# Patient Record
Sex: FEMALE | Race: WHITE | Employment: FULL TIME | URBAN - METROPOLITAN AREA
[De-identification: names, ages, dates, MRNs, and addresses within clinical notes are randomized per-mention and may not be internally consistent; named-entity substitution may affect disease eponyms.]

---

## 2019-12-16 ENCOUNTER — APPOINTMENT (OUTPATIENT)
Dept: CT IMAGING | Age: 34
End: 2019-12-16
Payer: COMMERCIAL

## 2019-12-16 ENCOUNTER — HOSPITAL ENCOUNTER (EMERGENCY)
Age: 34
Discharge: HOME | End: 2019-12-16
Payer: COMMERCIAL

## 2019-12-16 VITALS
BODY MASS INDEX: 21.34 KG/M2 | RESPIRATION RATE: 14 BRPM | DIASTOLIC BLOOD PRESSURE: 72 MMHG | TEMPERATURE: 97.9 F | WEIGHT: 125 LBS | OXYGEN SATURATION: 98 % | SYSTOLIC BLOOD PRESSURE: 107 MMHG | HEIGHT: 64 IN | HEART RATE: 60 BPM

## 2019-12-16 DIAGNOSIS — R42 DIZZINESS: Primary | ICD-10-CM

## 2019-12-16 DIAGNOSIS — R51.9 NONINTRACTABLE HEADACHE, UNSPECIFIED CHRONICITY PATTERN, UNSPECIFIED HEADACHE TYPE: ICD-10-CM

## 2019-12-16 DIAGNOSIS — R20.2 TINGLING: ICD-10-CM

## 2019-12-16 LAB
ALBUMIN SERPL-MCNC: 4.8 G/DL (ref 3.5–4.6)
ALP BLD-CCNC: 55 U/L (ref 40–130)
ALT SERPL-CCNC: 12 U/L (ref 0–33)
AMPHETAMINE SCREEN, URINE: NORMAL
ANION GAP SERPL CALCULATED.3IONS-SCNC: 13 MEQ/L (ref 9–15)
AST SERPL-CCNC: 20 U/L (ref 0–35)
BARBITURATE SCREEN URINE: NORMAL
BASOPHILS ABSOLUTE: 0 K/UL (ref 0–0.2)
BASOPHILS RELATIVE PERCENT: 0.7 %
BENZODIAZEPINE SCREEN, URINE: NORMAL
BILIRUB SERPL-MCNC: <0.2 MG/DL (ref 0.2–0.7)
BILIRUBIN URINE: NEGATIVE
BLOOD, URINE: NEGATIVE
BUN BLDV-MCNC: 10 MG/DL (ref 6–20)
CALCIUM SERPL-MCNC: 9.6 MG/DL (ref 8.5–9.9)
CANNABINOID SCREEN URINE: NORMAL
CHLORIDE BLD-SCNC: 104 MEQ/L (ref 95–107)
CLARITY: CLEAR
CO2: 25 MEQ/L (ref 20–31)
COCAINE METABOLITE SCREEN URINE: NORMAL
COLOR: YELLOW
CREAT SERPL-MCNC: 0.71 MG/DL (ref 0.5–0.9)
EKG ATRIAL RATE: 53 BPM
EKG P AXIS: 60 DEGREES
EKG P-R INTERVAL: 160 MS
EKG Q-T INTERVAL: 432 MS
EKG QRS DURATION: 78 MS
EKG QTC CALCULATION (BAZETT): 405 MS
EKG R AXIS: 56 DEGREES
EKG T AXIS: 39 DEGREES
EKG VENTRICULAR RATE: 53 BPM
EOSINOPHILS ABSOLUTE: 0 K/UL (ref 0–0.7)
EOSINOPHILS RELATIVE PERCENT: 0.8 %
ETHANOL PERCENT: NORMAL G/DL
ETHANOL: <10 MG/DL (ref 0–0.08)
GFR AFRICAN AMERICAN: >60
GFR NON-AFRICAN AMERICAN: >60
GLOBULIN: 2.7 G/DL (ref 2.3–3.5)
GLUCOSE BLD-MCNC: 89 MG/DL (ref 60–115)
GLUCOSE BLD-MCNC: 90 MG/DL (ref 70–99)
GLUCOSE URINE: NEGATIVE MG/DL
HCG, URINE, POC: NEGATIVE
HCT VFR BLD CALC: 41.1 % (ref 37–47)
HEMOGLOBIN: 14 G/DL (ref 12–16)
INFLUENZA A BY PCR: NEGATIVE
INFLUENZA B BY PCR: NEGATIVE
KETONES, URINE: NEGATIVE MG/DL
LEUKOCYTE ESTERASE, URINE: NEGATIVE
LYMPHOCYTES ABSOLUTE: 1.3 K/UL (ref 1–4.8)
LYMPHOCYTES RELATIVE PERCENT: 27 %
Lab: NORMAL
Lab: NORMAL
MAGNESIUM: 2.1 MG/DL (ref 1.7–2.4)
MCH RBC QN AUTO: 30.2 PG (ref 27–31.3)
MCHC RBC AUTO-ENTMCNC: 34 % (ref 33–37)
MCV RBC AUTO: 88.8 FL (ref 82–100)
METHADONE SCREEN, URINE: NORMAL
MONOCYTES ABSOLUTE: 0.3 K/UL (ref 0.2–0.8)
MONOCYTES RELATIVE PERCENT: 6.6 %
NEGATIVE QC PASS/FAIL: NORMAL
NEUTROPHILS ABSOLUTE: 3.2 K/UL (ref 1.4–6.5)
NEUTROPHILS RELATIVE PERCENT: 64.9 %
NITRITE, URINE: NEGATIVE
OPIATE SCREEN URINE: NORMAL
OXYCODONE URINE: NORMAL
PDW BLD-RTO: 13 % (ref 11.5–14.5)
PERFORMED ON: NORMAL
PH UA: 7 (ref 5–9)
PHENCYCLIDINE SCREEN URINE: NORMAL
PLATELET # BLD: 194 K/UL (ref 130–400)
POSITIVE QC PASS/FAIL: NORMAL
POTASSIUM SERPL-SCNC: 4.4 MEQ/L (ref 3.4–4.9)
PROPOXYPHENE SCREEN: NORMAL
PROTEIN UA: NEGATIVE MG/DL
RBC # BLD: 4.63 M/UL (ref 4.2–5.4)
SODIUM BLD-SCNC: 142 MEQ/L (ref 135–144)
SPECIFIC GRAVITY UA: 1.01 (ref 1–1.03)
TOTAL PROTEIN: 7.5 G/DL (ref 6.3–8)
URINE REFLEX TO CULTURE: NORMAL
UROBILINOGEN, URINE: 0.2 E.U./DL
WBC # BLD: 5 K/UL (ref 4.8–10.8)

## 2019-12-16 PROCEDURE — 83735 ASSAY OF MAGNESIUM: CPT

## 2019-12-16 PROCEDURE — 81003 URINALYSIS AUTO W/O SCOPE: CPT

## 2019-12-16 PROCEDURE — 96375 TX/PRO/DX INJ NEW DRUG ADDON: CPT

## 2019-12-16 PROCEDURE — 80307 DRUG TEST PRSMV CHEM ANLYZR: CPT

## 2019-12-16 PROCEDURE — 80053 COMPREHEN METABOLIC PANEL: CPT

## 2019-12-16 PROCEDURE — 99284 EMERGENCY DEPT VISIT MOD MDM: CPT

## 2019-12-16 PROCEDURE — 72125 CT NECK SPINE W/O DYE: CPT

## 2019-12-16 PROCEDURE — 36415 COLL VENOUS BLD VENIPUNCTURE: CPT

## 2019-12-16 PROCEDURE — 93005 ELECTROCARDIOGRAM TRACING: CPT | Performed by: NURSE PRACTITIONER

## 2019-12-16 PROCEDURE — 93010 ELECTROCARDIOGRAM REPORT: CPT | Performed by: INTERNAL MEDICINE

## 2019-12-16 PROCEDURE — 96374 THER/PROPH/DIAG INJ IV PUSH: CPT

## 2019-12-16 PROCEDURE — 87502 INFLUENZA DNA AMP PROBE: CPT

## 2019-12-16 PROCEDURE — 2580000003 HC RX 258: Performed by: NURSE PRACTITIONER

## 2019-12-16 PROCEDURE — 70450 CT HEAD/BRAIN W/O DYE: CPT

## 2019-12-16 PROCEDURE — 6360000002 HC RX W HCPCS: Performed by: NURSE PRACTITIONER

## 2019-12-16 PROCEDURE — 85025 COMPLETE CBC W/AUTO DIFF WBC: CPT

## 2019-12-16 PROCEDURE — G0480 DRUG TEST DEF 1-7 CLASSES: HCPCS

## 2019-12-16 RX ORDER — DIPHENHYDRAMINE HYDROCHLORIDE 50 MG/ML
25 INJECTION INTRAMUSCULAR; INTRAVENOUS ONCE
Status: COMPLETED | OUTPATIENT
Start: 2019-12-16 | End: 2019-12-16

## 2019-12-16 RX ORDER — BUTALBITAL, ACETAMINOPHEN AND CAFFEINE 300; 40; 50 MG/1; MG/1; MG/1
1 CAPSULE ORAL EVERY 4 HOURS PRN
Qty: 15 CAPSULE | Refills: 0 | Status: SHIPPED | OUTPATIENT
Start: 2019-12-16

## 2019-12-16 RX ORDER — 0.9 % SODIUM CHLORIDE 0.9 %
1000 INTRAVENOUS SOLUTION INTRAVENOUS ONCE
Status: COMPLETED | OUTPATIENT
Start: 2019-12-16 | End: 2019-12-16

## 2019-12-16 RX ORDER — METOCLOPRAMIDE HYDROCHLORIDE 5 MG/ML
10 INJECTION INTRAMUSCULAR; INTRAVENOUS ONCE
Status: COMPLETED | OUTPATIENT
Start: 2019-12-16 | End: 2019-12-16

## 2019-12-16 RX ADMIN — METOCLOPRAMIDE 10 MG: 5 INJECTION, SOLUTION INTRAMUSCULAR; INTRAVENOUS at 13:29

## 2019-12-16 RX ADMIN — DIPHENHYDRAMINE HYDROCHLORIDE 25 MG: 50 INJECTION, SOLUTION INTRAMUSCULAR; INTRAVENOUS at 13:29

## 2019-12-16 RX ADMIN — SODIUM CHLORIDE 1000 ML: 9 INJECTION, SOLUTION INTRAVENOUS at 13:30

## 2019-12-16 ASSESSMENT — ENCOUNTER SYMPTOMS
SHORTNESS OF BREATH: 0
BACK PAIN: 0
RHINORRHEA: 0
COUGH: 0
VOMITING: 0
NAUSEA: 0
DIARRHEA: 0
ABDOMINAL PAIN: 0
PHOTOPHOBIA: 0
EYE PAIN: 0
SORE THROAT: 0

## 2021-04-20 ENCOUNTER — HOSPITAL ENCOUNTER (EMERGENCY)
Age: 36
Discharge: HOME | End: 2021-04-20
Payer: COMMERCIAL

## 2021-04-20 VITALS
WEIGHT: 125 LBS | TEMPERATURE: 98.1 F | RESPIRATION RATE: 18 BRPM | SYSTOLIC BLOOD PRESSURE: 146 MMHG | OXYGEN SATURATION: 98 % | HEART RATE: 69 BPM | HEIGHT: 64 IN | DIASTOLIC BLOOD PRESSURE: 93 MMHG | BODY MASS INDEX: 21.34 KG/M2

## 2021-04-20 DIAGNOSIS — J32.4 CHRONIC PANSINUSITIS: Primary | ICD-10-CM

## 2021-04-20 LAB — SARS-COV-2, NAAT: NOT DETECTED

## 2021-04-20 PROCEDURE — 87635 SARS-COV-2 COVID-19 AMP PRB: CPT

## 2021-04-20 PROCEDURE — 99283 EMERGENCY DEPT VISIT LOW MDM: CPT

## 2021-04-20 RX ORDER — DOXYCYCLINE HYCLATE 100 MG
100 TABLET ORAL 2 TIMES DAILY
Qty: 20 TABLET | Refills: 0 | Status: SHIPPED | OUTPATIENT
Start: 2021-04-20 | End: 2021-04-30

## 2021-04-20 ASSESSMENT — ENCOUNTER SYMPTOMS
CHEST TIGHTNESS: 0
TROUBLE SWALLOWING: 0
COUGH: 0
DIARRHEA: 0
WHEEZING: 0
ABDOMINAL DISTENTION: 0
CONSTIPATION: 0
ABDOMINAL PAIN: 0
BACK PAIN: 0
SINUS PRESSURE: 1
SHORTNESS OF BREATH: 0
VOMITING: 0
SORE THROAT: 0
NAUSEA: 0
RHINORRHEA: 0
SINUS PAIN: 1
COLOR CHANGE: 0

## 2021-04-20 NOTE — ED TRIAGE NOTES
The patient states she has had a Sinus cold/ headache for four weeks. She was prescribed antibiotics on 4/12/21 by her MD. She states that she still feels the pain/pressure.

## 2021-04-20 NOTE — ED PROVIDER NOTES
disturbance. Respiratory: Negative for cough, chest tightness, shortness of breath and wheezing. Cardiovascular: Negative for chest pain and palpitations. Gastrointestinal: Negative for abdominal distention, abdominal pain, constipation, diarrhea, nausea and vomiting. Genitourinary: Negative for difficulty urinating, dysuria, flank pain, frequency and urgency. Musculoskeletal: Negative for arthralgias, back pain, myalgias, neck pain and neck stiffness. Skin: Negative for color change and rash. Neurological: Negative for dizziness, tremors, seizures, syncope, speech difficulty, weakness, light-headedness, numbness and headaches. Except as noted above the remainder of the review of systems was reviewed and negative.        PAST MEDICAL HISTORY     Past Medical History:   Diagnosis Date    Abnormal Pap smear of cervix     Allergic     Endometriosis     Psoriasis      Past Surgical History:   Procedure Laterality Date    HYSTERECTOMY      LAPAROSCOPY      2X    WISDOM TOOTH EXTRACTION       Social History     Socioeconomic History    Marital status:      Spouse name: None    Number of children: None    Years of education: None    Highest education level: None   Occupational History    None   Social Needs    Financial resource strain: None    Food insecurity     Worry: None     Inability: None    Transportation needs     Medical: None     Non-medical: None   Tobacco Use    Smoking status: Former Smoker     Packs/day: 1.00    Smokeless tobacco: Never Used   Substance and Sexual Activity    Alcohol use: No     Comment: occ    Drug use: No    Sexual activity: Yes     Partners: Male   Lifestyle    Physical activity     Days per week: None     Minutes per session: None    Stress: None   Relationships    Social connections     Talks on phone: None     Gets together: None     Attends Orthodox service: None     Active member of club or organization: None     Attends meetings of clubs or organizations: None     Relationship status: None    Intimate partner violence     Fear of current or ex partner: None     Emotionally abused: None     Physically abused: None     Forced sexual activity: None   Other Topics Concern    None   Social History Narrative    None       SCREENINGS             PHYSICAL EXAM    (up to 7 for level 4, 8 or more for level 5)     ED Triage Vitals [04/20/21 1459]   BP Temp Temp Source Pulse Resp SpO2 Height Weight   (!) 146/93 98.1 °F (36.7 °C) Oral 69 18 98 % 5' 4\" (1.626 m) 125 lb (56.7 kg)       Physical Exam  Constitutional:       General: She is not in acute distress. Appearance: Normal appearance. She is normal weight. She is not ill-appearing, toxic-appearing or diaphoretic. HENT:      Head: Normocephalic and atraumatic. Right Ear: Hearing and external ear normal. A middle ear effusion is present. Tympanic membrane is bulging. Tympanic membrane is not erythematous. Left Ear: Hearing and external ear normal.  No middle ear effusion. Tympanic membrane is bulging. Tympanic membrane is not erythematous. Nose: Congestion present. No rhinorrhea. Mouth/Throat:      Mouth: Mucous membranes are moist.      Pharynx: Oropharynx is clear. No oropharyngeal exudate or posterior oropharyngeal erythema. Eyes:      General:         Right eye: No discharge. Left eye: No discharge. Conjunctiva/sclera: Conjunctivae normal.      Pupils: Pupils are equal, round, and reactive to light. Neck:      Musculoskeletal: Normal range of motion and neck supple. No neck rigidity or muscular tenderness. Cardiovascular:      Rate and Rhythm: Normal rate and regular rhythm. Pulses: Normal pulses. Pulmonary:      Effort: Pulmonary effort is normal.      Breath sounds: Normal breath sounds. Musculoskeletal: Normal range of motion. General: No tenderness or signs of injury. Skin:     General: Skin is warm and dry.       Capillary Refill: Capillary refill takes less than 2 seconds. Neurological:      General: No focal deficit present. Mental Status: She is alert and oriented to person, place, and time. Mental status is at baseline. Cranial Nerves: No cranial nerve deficit. Sensory: No sensory deficit. Motor: No weakness. Coordination: Coordination normal.         RESULTS     EKG: All EKG's are interpreted by the Emergency Department Physician who either signs or Co-signsthis chart in the absence of a cardiologist.        RADIOLOGY:   Gayla Dior such as CT, Ultrasound and MRI are read by the radiologist. Plain radiographic images are visualized and preliminarily interpreted by the emergency physician with the below findings:        Interpretation per the Radiologist below, if available at the time ofthis note:    No orders to display         ED BEDSIDE ULTRASOUND:   Performed by ED Physician - none    LABS:  Labs Reviewed   COVID-19, RAPID       All other labs were within normal range or not returned as of this dictation. EMERGENCY DEPARTMENT COURSE and DIFFERENTIAL DIAGNOSIS/MDM:   Vitals:    Vitals:    04/20/21 1459   BP: (!) 146/93   Pulse: 69   Resp: 18   Temp: 98.1 °F (36.7 °C)   TempSrc: Oral   SpO2: 98%   Weight: 125 lb (56.7 kg)   Height: 5' 4\" (1.626 m)            MDM patient is afebrile nontoxic no acute distress hemodynamically stable. Patient has a history of recurrent chronic sinusitis. She has not finished the previous Augmentin she was stopped early and switched over to azithromycin. Additionally she did finish a Medrol Dosepak. Patient be placed on doxycycline but more appropriately was referred to an ENT specialist for chronic sinusitis and recurrent symptoms. She does have antihistamine and nasal sprays at home use she will use them more consistently, admits that she does not use them every day.   Directed to follow-up with primary care provider and ENT specialist as possible for further evaluation. Return to the ER if any onset of new concerns of the worsening condition specifically onset of fever severe headaches or neurological problems with headaches. Patient verbalized understandable given instruction education. CRITICAL CARE TIME       CONSULTS:  None    PROCEDURES:  Unless otherwise noted below, none     Procedures    FINAL IMPRESSION      1.  Chronic pansinusitis          DISPOSITION/PLAN   DISPOSITION Discharge - Pending Orders Complete 04/20/2021 04:03:40 PM      PATIENT REFERRED TO:  Stella Bob MD  [address]  [address]  [facility] [place] [address]  [phone]    Call in 1 day      Sergio Cordero MD  [address] [address]  [phone]    Call in 1 day        DISCHARGE MEDICATIONS:  New Prescriptions    DOXYCYCLINE HYCLATE (VIBRA-TABS) 100 MG TABLET    Take 1 tablet by mouth 2 times daily for 10 days          (Please notethat portions of this note were completed with a voice recognition program.  Efforts were made to edit the dictations but occasionally words are mis-transcribed.)    EDMUND Pierce CNP (electronically signed)  Attending Emergency Physician         EDMUND Pierce CNP  04/20/21 7101

## 2021-04-27 ENCOUNTER — OFFICE VISIT (OUTPATIENT)
Dept: ENT CLINIC | Age: 36
End: 2021-04-27
Payer: COMMERCIAL

## 2021-04-27 VITALS
HEART RATE: 93 BPM | HEIGHT: 64 IN | SYSTOLIC BLOOD PRESSURE: 129 MMHG | BODY MASS INDEX: 21.34 KG/M2 | DIASTOLIC BLOOD PRESSURE: 78 MMHG | WEIGHT: 125 LBS | OXYGEN SATURATION: 99 % | TEMPERATURE: 98 F

## 2021-04-27 DIAGNOSIS — J30.2 SEASONAL ALLERGIC RHINITIS, UNSPECIFIED TRIGGER: Primary | ICD-10-CM

## 2021-04-27 PROCEDURE — 99204 OFFICE O/P NEW MOD 45 MIN: CPT | Performed by: OTOLARYNGOLOGY

## 2021-04-27 RX ORDER — FLUTICASONE PROPIONATE 50 MCG
SPRAY, SUSPENSION (ML) NASAL
COMMUNITY
Start: 2021-04-01

## 2021-04-27 RX ORDER — MONTELUKAST SODIUM 10 MG/1
10 TABLET ORAL DAILY
Qty: 30 TABLET | Refills: 3 | Status: SHIPPED | OUTPATIENT
Start: 2021-04-27

## 2021-04-27 RX ORDER — IBUPROFEN 600 MG/1
TABLET ORAL
COMMUNITY

## 2021-04-27 RX ORDER — IPRATROPIUM BROMIDE 42 UG/1
2 SPRAY, METERED NASAL 4 TIMES DAILY
Qty: 1 BOTTLE | Refills: 3 | Status: SHIPPED | OUTPATIENT
Start: 2021-04-27

## 2021-04-27 RX ORDER — FLUCONAZOLE 150 MG/1
TABLET ORAL
COMMUNITY

## 2021-04-27 RX ORDER — MELOXICAM 7.5 MG/1
7.5 TABLET ORAL DAILY
Qty: 30 TABLET | Refills: 3 | Status: SHIPPED | OUTPATIENT
Start: 2021-04-27

## 2021-04-27 ASSESSMENT — ENCOUNTER SYMPTOMS
SINUS PAIN: 1
SINUS PRESSURE: 1
SORE THROAT: 1
VOICE CHANGE: 0
RHINORRHEA: 1
TROUBLE SWALLOWING: 0
FACIAL SWELLING: 0

## 2021-04-27 NOTE — PROGRESS NOTES
[employer] service: Not on file     Active member of club or organization: Not on file     Attends meetings of clubs or organizations: Not on file     Relationship status: Not on file    Intimate partner violence     Fear of current or ex partner: Not on file     Emotionally abused: Not on file     Physically abused: Not on file     Forced sexual activity: Not on file   Other Topics Concern    Not on file   Social History Narrative    Not on file     Family History   Problem Relation Age of Onset    Cancer Brother         brain    Diabetes Paternal Grandfather     High Blood Pressure Paternal Grandfather     Heart Attack Paternal Grandfather      Allergies   Allergen Reactions    Mobic [Meloxicam] Itching    Sulfa Antibiotics          Review of Systems   HENT: Positive for congestion, rhinorrhea, sinus pressure, sinus pain, sneezing and sore throat. Negative for dental problem, drooling, ear discharge, ear pain, facial swelling, hearing loss, mouth sores, nosebleeds, postnasal drip, tinnitus, trouble swallowing and voice change. All other systems reviewed and are negative. Objective:   /78 (Site: Right Upper Arm, Position: Sitting, Cuff Size: Large Adult)   Pulse 93   Temp 98 °F (36.7 °C) (Infrared)   Ht 5' 4\" (1.626 m)   Wt 125 lb (56.7 kg)   LMP 04/08/2015   SpO2 99%   BMI 21.46 kg/m²     Physical Exam  Vitals signs and nursing note reviewed. Constitutional:       General: She is not in acute distress. Appearance: Normal appearance. She is normal weight. She is not ill-appearing, toxic-appearing or diaphoretic. HENT:      Head: Normocephalic. No raccoon eyes, Fofana's sign, abrasion, contusion, masses, right periorbital erythema, left periorbital erythema or laceration. Hair is normal.      Jaw: No trismus, tenderness, swelling, pain on movement or malocclusion. Salivary Glands: Right salivary gland is not diffusely enlarged or tender.  Left salivary gland is not diffusely enlarged or tender. Right Ear: Hearing, tympanic membrane, ear canal and external ear normal. No decreased hearing noted. No laceration, drainage, swelling or tenderness. No middle ear effusion. There is no impacted cerumen. No foreign body. No mastoid tenderness. No PE tube. No hemotympanum. Tympanic membrane is not injected, scarred, perforated, erythematous, retracted or bulging. Tympanic membrane has normal mobility. Left Ear: Hearing, tympanic membrane, ear canal and external ear normal. No decreased hearing noted. No laceration, drainage, swelling or tenderness. No middle ear effusion. There is no impacted cerumen. No foreign body. No mastoid tenderness. No PE tube. No hemotympanum. Tympanic membrane is not injected, scarred, perforated, erythematous, retracted or bulging. Tympanic membrane has normal mobility. Nose: Mucosal edema, congestion and rhinorrhea present. No nasal deformity, septal deviation, signs of injury, laceration or nasal tenderness. Right Nostril: No foreign body, epistaxis, septal hematoma or occlusion. Left Nostril: No foreign body, epistaxis, septal hematoma or occlusion. Right Turbinates: Not enlarged, swollen or pale. Left Turbinates: Not enlarged, swollen or pale. Right Sinus: No maxillary sinus tenderness or frontal sinus tenderness. Left Sinus: No maxillary sinus tenderness or frontal sinus tenderness. Comments: Patient     Mouth/Throat:      Lips: Pink. No lesions. Mouth: Mucous membranes are moist. No injury, lacerations, oral lesions or angioedema. Dentition: Does not have dentures. No dental tenderness, gingival swelling, dental caries, dental abscesses or gum lesions. Tongue: No lesions. Tongue does not deviate from midline. Palate: No mass and lesions. Pharynx: Oropharynx is clear. No pharyngeal swelling, oropharyngeal exudate, posterior oropharyngeal erythema or uvula swelling.       Tonsils: No tonsillar exudate or tonsillar abscesses. Eyes:      General: No scleral icterus. Right eye: No discharge. Left eye: No discharge. Extraocular Movements: Extraocular movements intact. Conjunctiva/sclera: Conjunctivae normal.      Pupils: Pupils are equal, round, and reactive to light. Neck:      Musculoskeletal: No neck rigidity or muscular tenderness. Vascular: No carotid bruit. Musculoskeletal:         General: No swelling, tenderness, deformity or signs of injury. Lymphadenopathy:      Cervical: No cervical adenopathy. Skin:     General: Skin is warm and dry. Neurological:      Mental Status: She is alert. Motor: No weakness. Coordination: Coordination normal.         Assessment:       Diagnosis Orders   1. Seasonal allergic rhinitis, unspecified trigger           Plan:      No orders of the defined types were placed in this encounter. Will discontinue all medication at present and put her on ipratropium bromide 0.06% 2 puffs each nostrils twice a day and Singulair 10 mg daily we will observe her for 2 weeks if the symptoms is not abated I will order a CAT scan of the paranasal sinuses to rule out presence of sinus abnormalities  Orders Placed This Encounter   Medications    ipratropium (ATROVENT) 0.06 % nasal spray     Si sprays by Each Nostril route 4 times daily     Dispense:  1 Bottle     Refill:  3    montelukast (SINGULAIR) 10 MG tablet     Sig: Take 1 tablet by mouth daily     Dispense:  30 tablet     Refill:  3    meloxicam (MOBIC) 7.5 MG tablet     Sig: Take 1 tablet by mouth daily     Dispense:  30 tablet     Refill:  3          Return for symptomatic follow up.       Wali Bishop MD

## 2021-04-28 RX ORDER — CYCLOBENZAPRINE HCL 5 MG
5 TABLET ORAL 2 TIMES DAILY PRN
Qty: 60 TABLET | Refills: 1 | Status: SHIPPED | OUTPATIENT
Start: 2021-04-28 | End: 2021-06-27

## 2021-05-11 ENCOUNTER — OFFICE VISIT (OUTPATIENT)
Dept: ENT CLINIC | Age: 36
End: 2021-05-11
Payer: COMMERCIAL

## 2021-05-11 VITALS
OXYGEN SATURATION: 98 % | WEIGHT: 125 LBS | BODY MASS INDEX: 21.34 KG/M2 | TEMPERATURE: 97.7 F | HEART RATE: 89 BPM | HEIGHT: 64 IN | SYSTOLIC BLOOD PRESSURE: 132 MMHG | DIASTOLIC BLOOD PRESSURE: 79 MMHG

## 2021-05-11 DIAGNOSIS — J32.9 CHRONIC SINUSITIS, UNSPECIFIED LOCATION: ICD-10-CM

## 2021-05-11 DIAGNOSIS — J30.2 SEASONAL ALLERGIC RHINITIS, UNSPECIFIED TRIGGER: Primary | ICD-10-CM

## 2021-05-11 PROCEDURE — 99213 OFFICE O/P EST LOW 20 MIN: CPT | Performed by: OTOLARYNGOLOGY

## 2021-05-11 RX ORDER — BUPROPION HYDROCHLORIDE 150 MG/1
TABLET ORAL
COMMUNITY
Start: 2021-05-03

## 2021-05-11 ASSESSMENT — ENCOUNTER SYMPTOMS
TROUBLE SWALLOWING: 0
SORE THROAT: 0
FACIAL SWELLING: 0
SINUS PRESSURE: 1
SINUS PAIN: 1
VOICE CHANGE: 0
RHINORRHEA: 1

## 2021-05-11 NOTE — PROGRESS NOTES
Subjective:      Patient ID: Deangelo Menezes is a 43 y.o. female who presents today for:  Chief Complaint   Patient presents with    Follow-up       HPI: Kami Victoria came back for follow-up and claimed that the nasal spray and antihistamine tablets  is helping her a little bit but continue to have intermittent postnasal dripping per facial pain and discomfort.   She is uncertain as to the cause of the above considering that she has been tried on antibiotic without any resolution to her problem I have my suspicion that she must be having an allergic component to her problem I will refer her to an allergist for allergy work-up and possible desensitization    Past Medical History:   Diagnosis Date    Abnormal Pap smear of cervix     Allergic     Dizziness     Endometriosis     Headache     Psoriasis      Past Surgical History:   Procedure Laterality Date    HYSTERECTOMY      LAPAROSCOPY      2X    WISDOM TOOTH EXTRACTION       Social History     Socioeconomic History    Marital status:      Spouse name: Not on file    Number of children: Not on file    Years of education: Not on file    Highest education level: Not on file   Occupational History    Not on file   Social Needs    Financial resource strain: Not on file    Food insecurity     Worry: Not on file     Inability: Not on file    Transportation needs     Medical: Not on file     Non-medical: Not on file   Tobacco Use    Smoking status: Former Smoker     Packs/day: 1.00    Smokeless tobacco: Never Used   Substance and Sexual Activity    Alcohol use: No     Comment: occ    Drug use: No    Sexual activity: Yes     Partners: Male   Lifestyle    Physical activity     Days per week: Not on file     Minutes per session: Not on file    Stress: Not on file   Relationships    Social connections     Talks on phone: Not on file     Gets together: Not on file     Attends Mormon service: Not on file     Active member of club or organization: Not on file     Attends meetings of clubs or organizations: Not on file     Relationship status: Not on file    Intimate partner violence     Fear of current or ex partner: Not on file     Emotionally abused: Not on file     Physically abused: Not on file     Forced sexual activity: Not on file   Other Topics Concern    Not on file   Social History Narrative    Not on file     Family History   Problem Relation Age of Onset    Cancer Brother         brain    Diabetes Paternal Grandfather     High Blood Pressure Paternal Grandfather     Heart Attack Paternal Grandfather      Allergies   Allergen Reactions    Mobic [Meloxicam] Itching    Sulfa Antibiotics          Review of Systems   HENT: Positive for congestion, postnasal drip, rhinorrhea, sinus pressure, sinus pain and sneezing. Negative for dental problem, drooling, ear discharge, ear pain, facial swelling, hearing loss, mouth sores, nosebleeds, sore throat, tinnitus, trouble swallowing and voice change. All other systems reviewed and are negative. Objective:   /79 (Site: Left Upper Arm, Position: Sitting, Cuff Size: Large Adult)   Pulse 89   Temp 97.7 °F (36.5 °C) (Infrared)   Ht 5' 4\" (1.626 m)   Wt 125 lb (56.7 kg)   LMP 04/08/2015   SpO2 98%   BMI 21.46 kg/m²     Physical Exam  HENT:      Nose:      Comments: She has a pinched nose external nasal deformity septum is mildly deviated breathing is not impede no intranasal mass        Assessment:       Diagnosis Orders   1. Seasonal allergic rhinitis, unspecified trigger  Amb External Referral To Allergy   2.  Chronic sinusitis, unspecified location           Plan:      Orders Placed This Encounter   Procedures    Amb External Referral To Allergy     Referral Priority:   Routine     Referral Type:   Consult for Advice and Opinion     Referral Reason:   Specialty Services Required     Referred to Provider:   Pratibha Warren MD     Requested Specialty:   Allergy & Immunology     Number of Visits Requested:   1     No orders of the defined types were placed in this encounter. Patient will be referred to Dr. Jeffrey Franco local allergist for evaluation and possible desensitization    Return for symptomatic follow up.       Tobi Henderson MD

## 2021-06-10 ENCOUNTER — HOSPITAL ENCOUNTER (EMERGENCY)
Age: 36
Discharge: HOME | End: 2021-06-10
Payer: COMMERCIAL

## 2021-06-10 VITALS
WEIGHT: 125 LBS | HEART RATE: 75 BPM | SYSTOLIC BLOOD PRESSURE: 110 MMHG | OXYGEN SATURATION: 100 % | RESPIRATION RATE: 17 BRPM | DIASTOLIC BLOOD PRESSURE: 78 MMHG | HEIGHT: 64 IN | BODY MASS INDEX: 21.34 KG/M2 | TEMPERATURE: 97.8 F

## 2021-06-10 DIAGNOSIS — R42 DIZZINESS: Primary | ICD-10-CM

## 2021-06-10 DIAGNOSIS — K64.8 OTHER HEMORRHOIDS: ICD-10-CM

## 2021-06-10 LAB
ALBUMIN SERPL-MCNC: 4.4 G/DL (ref 3.5–4.6)
ALP BLD-CCNC: 60 U/L (ref 40–130)
ALT SERPL-CCNC: 11 U/L (ref 0–33)
ANION GAP SERPL CALCULATED.3IONS-SCNC: 8 MEQ/L (ref 9–15)
AST SERPL-CCNC: 20 U/L (ref 0–35)
BASOPHILS ABSOLUTE: 0 K/UL (ref 0–0.2)
BASOPHILS RELATIVE PERCENT: 0.6 %
BILIRUB SERPL-MCNC: 0.4 MG/DL (ref 0.2–0.7)
BILIRUBIN URINE: NEGATIVE
BLOOD, URINE: NEGATIVE
BUN BLDV-MCNC: 10 MG/DL (ref 6–20)
CALCIUM SERPL-MCNC: 9.3 MG/DL (ref 8.5–9.9)
CHLORIDE BLD-SCNC: 105 MEQ/L (ref 95–107)
CLARITY: CLEAR
CO2: 27 MEQ/L (ref 20–31)
COLOR: YELLOW
CREAT SERPL-MCNC: 0.75 MG/DL (ref 0.5–0.9)
EOSINOPHILS ABSOLUTE: 0.1 K/UL (ref 0–0.7)
EOSINOPHILS RELATIVE PERCENT: 3 %
GFR AFRICAN AMERICAN: >60
GFR NON-AFRICAN AMERICAN: >60
GLOBULIN: 2.5 G/DL (ref 2.3–3.5)
GLUCOSE BLD-MCNC: 101 MG/DL (ref 70–99)
GLUCOSE URINE: NEGATIVE MG/DL
HCT VFR BLD CALC: 37.6 % (ref 37–47)
HEMOGLOBIN: 13 G/DL (ref 12–16)
KETONES, URINE: NEGATIVE MG/DL
LEUKOCYTE ESTERASE, URINE: NEGATIVE
LYMPHOCYTES ABSOLUTE: 1 K/UL (ref 1–4.8)
LYMPHOCYTES RELATIVE PERCENT: 26 %
MCH RBC QN AUTO: 30.8 PG (ref 27–31.3)
MCHC RBC AUTO-ENTMCNC: 34.7 % (ref 33–37)
MCV RBC AUTO: 88.8 FL (ref 82–100)
MONOCYTES ABSOLUTE: 0.4 K/UL (ref 0.2–0.8)
MONOCYTES RELATIVE PERCENT: 9.5 %
NEUTROPHILS ABSOLUTE: 2.3 K/UL (ref 1.4–6.5)
NEUTROPHILS RELATIVE PERCENT: 61 %
NITRITE, URINE: NEGATIVE
PDW BLD-RTO: 13.4 % (ref 11.5–14.5)
PH UA: 8 (ref 5–9)
PLATELET # BLD: 222 K/UL (ref 130–400)
PLATELET SLIDE REVIEW: NORMAL
POTASSIUM SERPL-SCNC: 4 MEQ/L (ref 3.4–4.9)
PROTEIN UA: NEGATIVE MG/DL
RBC # BLD: 4.23 M/UL (ref 4.2–5.4)
RBC # BLD: NORMAL 10*6/UL
SODIUM BLD-SCNC: 140 MEQ/L (ref 135–144)
SPECIFIC GRAVITY UA: 1 (ref 1–1.03)
TOTAL PROTEIN: 6.9 G/DL (ref 6.3–8)
URINE REFLEX TO CULTURE: NORMAL
UROBILINOGEN, URINE: 0.2 E.U./DL
WBC # BLD: 3.7 K/UL (ref 4.8–10.8)

## 2021-06-10 PROCEDURE — 99284 EMERGENCY DEPT VISIT MOD MDM: CPT

## 2021-06-10 PROCEDURE — 6370000000 HC RX 637 (ALT 250 FOR IP): Performed by: NURSE PRACTITIONER

## 2021-06-10 PROCEDURE — 81003 URINALYSIS AUTO W/O SCOPE: CPT

## 2021-06-10 PROCEDURE — 80053 COMPREHEN METABOLIC PANEL: CPT

## 2021-06-10 PROCEDURE — 36415 COLL VENOUS BLD VENIPUNCTURE: CPT

## 2021-06-10 PROCEDURE — 85025 COMPLETE CBC W/AUTO DIFF WBC: CPT

## 2021-06-10 PROCEDURE — 2580000003 HC RX 258: Performed by: NURSE PRACTITIONER

## 2021-06-10 RX ORDER — HYDROCORTISONE 25 MG/G
CREAM TOPICAL
Qty: 28 G | Refills: 0 | Status: SHIPPED | OUTPATIENT
Start: 2021-06-10

## 2021-06-10 RX ORDER — MECLIZINE HYDROCHLORIDE 25 MG/1
25 TABLET ORAL 3 TIMES DAILY PRN
Qty: 30 TABLET | Refills: 0 | Status: SHIPPED | OUTPATIENT
Start: 2021-06-10 | End: 2021-06-20

## 2021-06-10 RX ORDER — MECLIZINE HYDROCHLORIDE 25 MG/1
25 TABLET ORAL ONCE
Status: COMPLETED | OUTPATIENT
Start: 2021-06-10 | End: 2021-06-10

## 2021-06-10 RX ORDER — 0.9 % SODIUM CHLORIDE 0.9 %
1000 INTRAVENOUS SOLUTION INTRAVENOUS ONCE
Status: COMPLETED | OUTPATIENT
Start: 2021-06-10 | End: 2021-06-10

## 2021-06-10 RX ADMIN — SODIUM CHLORIDE 1000 ML: 9 INJECTION, SOLUTION INTRAVENOUS at 11:58

## 2021-06-10 RX ADMIN — MECLIZINE HYDROCHLORIDE 25 MG: 25 TABLET ORAL at 11:59

## 2021-06-10 ASSESSMENT — ENCOUNTER SYMPTOMS
COLOR CHANGE: 0
EYE DISCHARGE: 0
DIARRHEA: 0
RHINORRHEA: 0
EYE PAIN: 0
CONSTIPATION: 0
ABDOMINAL PAIN: 0
BLOOD IN STOOL: 0
TROUBLE SWALLOWING: 0
EYE REDNESS: 0
SHORTNESS OF BREATH: 0
COUGH: 0
NAUSEA: 0
ANAL BLEEDING: 1
SORE THROAT: 0
BACK PAIN: 0
VOMITING: 0
WHEEZING: 0

## 2021-06-10 NOTE — ED PROVIDER NOTES
[address] [facility] ED  EMERGENCY DEPARTMENT ENCOUNTER      Pt Name: Soraida Contreras  MRN: [id number]  [place] 1985  Date of evaluation: 6/10/2021  Provider: EDMUND Andrew CNP    CHIEF COMPLAINT       Chief Complaint   Patient presents with    Rectal Bleeding     rectal bleed, dizziness and confusion for the last few weeks    Dizziness         HISTORY OF PRESENT ILLNESS   (Location/Symptom, Timing/Onset,Context/Setting, Quality, Duration, Modifying Factors, Severity)  Note limiting factors. Soraida Contreras is a 43 y.o. female who presents to the emergency department with pmhx of endometriosis for a chief complaint of dizziness and hemorrhoids. Patient reports dizziness started 1-2 weeks ago where she feels loss of balance while sitting down and trouble focusing on activities. Patient reports that the rectal bleeding from hemorrhoids started today and showed me a picture, which appeared to be very minimal. Patient denies rectal pain, constipation, shortness of breath, headaches, or other complaints at this time. Nursing Notes were reviewed. REVIEW OF SYSTEMS    (2-9 systems for level 4, 10 or more for level 5)     Review of Systems   Constitutional: Negative for activity change, appetite change, fatigue and fever. HENT: Negative for congestion, ear pain, rhinorrhea, sore throat and trouble swallowing. Eyes: Negative for pain, discharge and redness. Respiratory: Negative for cough, shortness of breath and wheezing. Cardiovascular: Negative for chest pain and palpitations. Gastrointestinal: Positive for anal bleeding. Negative for abdominal pain, blood in stool, constipation, diarrhea, nausea and vomiting. Endocrine: Negative for polydipsia and polyuria. Genitourinary: Negative for decreased urine volume, dysuria, flank pain and hematuria. Musculoskeletal: Negative for arthralgias, back pain and myalgias. Skin: Negative for color change, rash and wound.    Neurological: Positive  Fear of Current or Ex-Partner:     Emotionally Abused:     Physically Abused:     Sexually Abused:        SCREENINGS    [place] Coma Scale  Eye Opening: Spontaneous  Best Verbal Response: Oriented  Best Motor Response: Obeys commands  Cain Coma Scale Score: 15        PHYSICAL EXAM    (up to 7 for level 4, 8 or more for level 5)     ED Triage Vitals [06/10/21 1133]   BP Temp Temp Source Pulse Resp SpO2 Height Weight   (!) 149/83 97.8 °F (36.6 °C) Temporal 75 17 99 % 5' 4\" (1.626 m) 125 lb (56.7 kg)       Physical Exam  Vitals and nursing note reviewed. Constitutional:       General: She is not in acute distress. Appearance: She is well-developed. She is not diaphoretic. HENT:      Head: Normocephalic and atraumatic. Nose: Nose normal.   Eyes:      Conjunctiva/sclera: Conjunctivae normal.      Pupils: Pupils are equal, round, and reactive to light. Cardiovascular:      Rate and Rhythm: Normal rate and regular rhythm. Heart sounds: Normal heart sounds. Pulmonary:      Effort: Pulmonary effort is normal. No respiratory distress. Breath sounds: Normal breath sounds. Abdominal:      General: Bowel sounds are normal.      Palpations: Abdomen is soft. Tenderness: There is no abdominal tenderness. Musculoskeletal:      Cervical back: Normal range of motion and neck supple. Skin:     General: Skin is warm and dry. Capillary Refill: Capillary refill takes less than 2 seconds. Findings: No rash. Neurological:      Mental Status: She is alert and oriented to person, place, and time. Cranial Nerves: No cranial nerve deficit.    Psychiatric:         Behavior: Behavior normal.         RESULTS     EKG: All EKG's are interpreted by the Emergency Department Physician who either signs or Co-signsthis chart in the absence of a cardiologist.        RADIOLOGY:   Non-plain filmimages such as CT, Ultrasound and MRI are read by the radiologist. Plain radiographic images are day        DISCHARGE MEDICATIONS:  Discharge Medication List as of 6/10/2021  1:03 PM      START taking these medications    Details   hydrocortisone (ANUSOL-HC) 2.5 % CREA rectal cream For hemorrhoids, Disp-28 g, R-0, Print      meclizine (ANTIVERT) 25 MG tablet Take 1 tablet by mouth 3 times daily as needed for Dizziness, Disp-30 tablet, R-0Print                (Please notethat portions of this note were completed with a voice recognition program.  Efforts were made to edit the dictations but occasionally words are mis-transcribed.)    EDMUND Lama CNP (electronically signed)  Attending Emergency Physician          EDMUND Almaraz CNP  06/10/21 [id number]

## 2021-06-10 NOTE — ED TRIAGE NOTES
Bright red rectal bleed, dizziness and confusion for the last few weeks.  Pt states that she has anxiety and thought it was from that at first. Pt denies pain at this time

## 2023-10-16 DIAGNOSIS — N28.1 RENAL CYST: ICD-10-CM

## 2023-10-17 DIAGNOSIS — N32.81 OAB (OVERACTIVE BLADDER): ICD-10-CM

## 2023-10-17 RX ORDER — MIRABEGRON 50 MG/1
50 TABLET, EXTENDED RELEASE ORAL DAILY
Qty: 90 TABLET | Refills: 3 | Status: SHIPPED | OUTPATIENT
Start: 2023-10-17

## 2023-10-19 ENCOUNTER — APPOINTMENT (OUTPATIENT)
Dept: RADIOLOGY | Facility: CLINIC | Age: 38
End: 2023-10-19
Payer: COMMERCIAL

## 2023-10-20 ENCOUNTER — APPOINTMENT (OUTPATIENT)
Dept: RADIOLOGY | Facility: CLINIC | Age: 38
End: 2023-10-20

## 2024-01-15 ENCOUNTER — PROCEDURE VISIT (OUTPATIENT)
Dept: UROLOGY | Facility: CLINIC | Age: 39
End: 2024-01-15
Payer: COMMERCIAL

## 2024-01-15 VITALS
RESPIRATION RATE: 16 BRPM | BODY MASS INDEX: 24.18 KG/M2 | SYSTOLIC BLOOD PRESSURE: 147 MMHG | WEIGHT: 140.87 LBS | DIASTOLIC BLOOD PRESSURE: 81 MMHG | HEART RATE: 81 BPM

## 2024-01-15 DIAGNOSIS — N39.0 URINARY TRACT INFECTION WITHOUT HEMATURIA, SITE UNSPECIFIED: ICD-10-CM

## 2024-01-15 DIAGNOSIS — R39.15 URGENCY OF URINATION: Primary | ICD-10-CM

## 2024-01-15 DIAGNOSIS — N28.1 COMPLEX RENAL CYST: ICD-10-CM

## 2024-01-15 LAB
POC APPEARANCE, URINE: CLEAR
POC BILIRUBIN, URINE: NEGATIVE
POC BLOOD, URINE: NEGATIVE
POC COLOR, URINE: YELLOW
POC GLUCOSE, URINE: NEGATIVE MG/DL
POC KETONES, URINE: NEGATIVE MG/DL
POC LEUKOCYTES, URINE: NEGATIVE
POC NITRITE,URINE: NEGATIVE
POC PH, URINE: 6 PH
POC PROTEIN, URINE: ABNORMAL MG/DL
POC SPECIFIC GRAVITY, URINE: 1.01
POC UROBILINOGEN, URINE: 0.2 EU/DL

## 2024-01-15 PROCEDURE — 81003 URINALYSIS AUTO W/O SCOPE: CPT | Performed by: UROLOGY

## 2024-01-15 PROCEDURE — 51798 US URINE CAPACITY MEASURE: CPT | Performed by: UROLOGY

## 2024-01-15 PROCEDURE — 52000 CYSTOURETHROSCOPY: CPT | Performed by: UROLOGY

## 2024-01-15 PROCEDURE — 99214 OFFICE O/P EST MOD 30 MIN: CPT | Performed by: UROLOGY

## 2024-01-15 PROCEDURE — 51741 ELECTRO-UROFLOWMETRY FIRST: CPT | Performed by: UROLOGY

## 2024-01-15 RX ORDER — VIBEGRON 75 MG/1
75 TABLET, FILM COATED ORAL DAILY
Qty: 90 TABLET | Refills: 3 | Status: SHIPPED | OUTPATIENT
Start: 2024-01-15 | End: 2024-01-16 | Stop reason: SDUPTHER
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
## 2024-01-15 NOTE — PROGRESS NOTES
"Patient ID: Na Green is a 38 y.o. female.  Pt denies any pain today. States has not had any recent hospital admits or surgeries since their last visit. Denies any concerns about falling or safety. ARABELLA    Pt states she is not currently taking any urologic medications. She tried several that were prescribed by this office but only Myrbetriq worked. She would like for us to resend in hopes that with failed medications insurance will now cover.       Procedures  Pt took macrodantin 50mg po as prescribed  Anesthesia: Local 2% Lidocaine  Instruments: 6F flexible disposable cystoscope    Pt brought to procedure room and placed in dorsal lithotomy position. Pt draped and prepped in normal sterile fashion. 5ml lidocaine instilled into urethral meatus and 5ml instilled into vagina. Pt tolerated well.    I was present as chaperone for the entirety of the procedure Lauren Meléndez  Cystoscopy performed by Dr. Yasmany Segundo        Bedside \"Time Out\" Verification   Today's Date: 01/15/2024 I attest that this time out verification took place prior to the procedure.   Procedure: cysto   RN/LPN/MA:ARABELLA   Provider: [facility].   Verified By: RN/LPN/MA, NA BARTOLO and Provider.   Prior to the start of the procedure a time out was taken and the following were verified: the identity of the patient using two patient identifiers, the correct procedure, the correct site marked as indicated, the correct positioning for the patient and the correct equipment was obtained.   Cystoscopy - female NA GREEN identified using two (2) forms of identification.   Procedure: diagnostic cystourethroscopy.  Procedure Note: Time Started: 5:00pm. Time Completed: 5:42 PM  Indications for procedure: irritable voiding symptoms. recurrent urinary tract infection (UTI).   Discussed with patient: Risks, benefits, and alternative were discussed in detail. Patient appears to understand and agrees to proceed. Patient has signed the procedure consent " form.    CYSTOSCOPY:    Cystoscopy today reveals a normal urethra and bladder neck.  Once inside the bladder, both ureteral orifices were identified.  Severe erythema in the lower half of the bladder.  Flow rate 27 cc/s, total volume 319 cc, PVR 17 cc.  No evidence of stones or tumors.

## 2024-01-16 DIAGNOSIS — R39.15 URGENCY OF URINATION: ICD-10-CM

## 2024-01-20 RX ORDER — VIBEGRON 75 MG/1
75 TABLET, FILM COATED ORAL DAILY
Qty: 90 TABLET | Refills: 3 | Status: SHIPPED | OUTPATIENT
Start: 2024-01-20

## 2024-10-03 ENCOUNTER — HOSPITAL ENCOUNTER (OUTPATIENT)
Dept: WOMENS IMAGING | Age: 39
Discharge: HOME | End: 2024-10-05
Payer: COMMERCIAL

## 2024-10-03 VITALS — BODY MASS INDEX: 23.17 KG/M2 | WEIGHT: 135 LBS

## 2024-10-03 DIAGNOSIS — Z12.31 BREAST CANCER SCREENING BY MAMMOGRAM: ICD-10-CM

## 2024-10-03 PROCEDURE — 77063 BREAST TOMOSYNTHESIS BI: CPT

## 2025-01-01 ENCOUNTER — OFFICE VISIT (OUTPATIENT)
Dept: URGENT CARE | Age: 40
End: 2025-01-01
Payer: COMMERCIAL

## 2025-01-01 VITALS
HEART RATE: 75 BPM | RESPIRATION RATE: 14 BRPM | TEMPERATURE: 98.2 F | OXYGEN SATURATION: 98 % | DIASTOLIC BLOOD PRESSURE: 85 MMHG | WEIGHT: 135 LBS | SYSTOLIC BLOOD PRESSURE: 126 MMHG | BODY MASS INDEX: 23.17 KG/M2

## 2025-01-01 DIAGNOSIS — B02.9 HERPES ZOSTER WITHOUT COMPLICATION: Primary | ICD-10-CM

## 2025-01-01 PROCEDURE — 99203 OFFICE O/P NEW LOW 30 MIN: CPT | Performed by: NURSE PRACTITIONER

## 2025-01-01 PROCEDURE — 1036F TOBACCO NON-USER: CPT | Performed by: NURSE PRACTITIONER

## 2025-01-01 RX ORDER — VALACYCLOVIR HYDROCHLORIDE 1 G/1
1000 TABLET, FILM COATED ORAL 2 TIMES DAILY
Qty: 14 TABLET | Refills: 0 | Status: SHIPPED | OUTPATIENT
Start: 2025-01-01 | End: 2025-01-08

## 2025-01-01 ASSESSMENT — PAIN SCALES - GENERAL: PAINLEVEL_OUTOF10: 2

## 2025-01-01 NOTE — PROGRESS NOTES
Subjective   Patient ID: Beverly Green is a 39 y.o. female. They present today with a chief complaint of Suspicious Skin Lesion (Right hand lesion x 1 week).    History of Present Illness  Rash to the right hand x 1 week. Itching and now painful. Used topical steroid which she states did not improve rash and made it worse. Previous history of shingles of the right side last year. She states she has been under stress. Pt showed me picture of when it first started and was vesicular type rash.           Past Medical History  Allergies as of 01/01/2025 - never reviewed   Allergen Reaction Noted    Sulfa (sulfonamide antibiotics) Unknown 01/01/2025    Meloxicam Itching and Unknown 10/30/2017       (Not in a hospital admission)       No past medical history on file.    Past Surgical History:   Procedure Laterality Date    OTHER SURGICAL HISTORY  02/26/2022    Hysterectomy    OTHER SURGICAL HISTORY  02/26/2022    Endometrial biopsy        reports that she quit smoking about 8 years ago. Her smoking use included cigarettes. She started smoking about 23 years ago. She has a 15 pack-year smoking history. She has never been exposed to tobacco smoke. She has never used smokeless tobacco.    Review of Systems  Review of Systems     10 point ROS completed and all are negative other than what is stated in the current HPI                            Objective    Vitals:    01/01/25 1510   BP: 126/85   BP Location: Left arm   Patient Position: Sitting   BP Cuff Size: Adult   Pulse: 75   Resp: 14   Temp: 36.8 °C (98.2 °F)   TempSrc: Temporal   SpO2: 98%   Weight: 61.2 kg (135 lb)     No LMP recorded.    Physical Exam  Vitals and nursing note reviewed.   Constitutional:       Appearance: Normal appearance.   Skin:     General: Skin is warm and dry.      Findings: Rash present. Rash is vesicular.      Comments: Top of right hand between the thumb and index finger; no other skin abnormalities noted   Neurological:      Mental Status: She  is alert.         Procedures    Point of Care Test & Imaging Results from this visit  No results found for this visit on 01/01/25.   No results found.    Diagnostic study results (if any) were reviewed by IRISH Campbell.    Assessment/Plan   Allergies, medications, history, and pertinent labs/EKGs/Imaging reviewed by IRISH Campbell.     Medical Decision Making  Rash:  - Unclear etiology; most likely shingles per assessment  - Area had fluid filled vesicals when initially started and was clustered  - Hx of shingles on the right side last year; increase stress with holidays  - Area is itching and painful    Orders and Diagnoses  There are no diagnoses linked to this encounter.    Medical Admin Record      Patient disposition: Home    Electronically signed by IRISH Campbell  3:13 PM

## 2025-01-21 ENCOUNTER — APPOINTMENT (OUTPATIENT)
Dept: UROLOGY | Facility: CLINIC | Age: 40
End: 2025-01-21
Payer: COMMERCIAL

## 2025-01-27 ENCOUNTER — HOSPITAL ENCOUNTER (OUTPATIENT)
Dept: RADIOLOGY | Facility: HOSPITAL | Age: 40
Discharge: HOME | End: 2025-01-27
Payer: COMMERCIAL

## 2025-01-27 DIAGNOSIS — N28.1 RENAL CYST: ICD-10-CM

## 2025-01-27 PROCEDURE — 76770 US EXAM ABDO BACK WALL COMP: CPT

## 2025-01-27 PROCEDURE — 76770 US EXAM ABDO BACK WALL COMP: CPT | Performed by: RADIOLOGY

## 2025-01-30 ENCOUNTER — APPOINTMENT (OUTPATIENT)
Dept: LAB | Facility: HOSPITAL | Age: 40
End: 2025-01-30
Payer: COMMERCIAL

## 2025-02-19 RX ORDER — NITROFURANTOIN MACROCRYSTALS 50 MG/1
50 CAPSULE ORAL EVERY 12 HOURS
Qty: 4 CAPSULE | Refills: 0 | Status: SHIPPED | OUTPATIENT
Start: 2025-02-19 | End: 2025-02-21

## 2025-02-23 LAB
APPEARANCE UR: ABNORMAL
BACTERIA #/AREA URNS HPF: ABNORMAL /HPF
BACTERIA UR CULT: NORMAL
BILIRUB UR QL STRIP: NEGATIVE
COLOR UR: YELLOW
GLUCOSE UR QL STRIP: NEGATIVE
HGB UR QL STRIP: NEGATIVE
KETONES UR QL STRIP: ABNORMAL
LEUKOCYTE ESTERASE UR QL STRIP: NEGATIVE
NITRITE UR QL STRIP: NEGATIVE
PH UR STRIP: 5.5 [PH] (ref 5–8)
PROT UR QL STRIP: NEGATIVE
RBC #/AREA URNS HPF: ABNORMAL /HPF
SP GR UR STRIP: 1.03 (ref 1–1.03)
SQUAMOUS #/AREA URNS HPF: ABNORMAL /HPF
WBC #/AREA URNS HPF: ABNORMAL /HPF

## 2025-02-25 ENCOUNTER — APPOINTMENT (OUTPATIENT)
Dept: UROLOGY | Facility: CLINIC | Age: 40
End: 2025-02-25
Payer: COMMERCIAL

## 2025-02-25 VITALS
WEIGHT: 144.18 LBS | HEART RATE: 55 BPM | RESPIRATION RATE: 16 BRPM | DIASTOLIC BLOOD PRESSURE: 76 MMHG | BODY MASS INDEX: 24.75 KG/M2 | SYSTOLIC BLOOD PRESSURE: 130 MMHG

## 2025-02-25 DIAGNOSIS — N28.1 COMPLEX RENAL CYST: ICD-10-CM

## 2025-02-25 DIAGNOSIS — N40.0 BENIGN PROSTATIC HYPERPLASIA, UNSPECIFIED WHETHER LOWER URINARY TRACT SYMPTOMS PRESENT: ICD-10-CM

## 2025-02-25 DIAGNOSIS — N32.81 OAB (OVERACTIVE BLADDER): ICD-10-CM

## 2025-02-25 DIAGNOSIS — N39.0 URINARY TRACT INFECTION WITHOUT HEMATURIA, SITE UNSPECIFIED: ICD-10-CM

## 2025-02-25 DIAGNOSIS — N28.1 RENAL CYST: ICD-10-CM

## 2025-02-25 DIAGNOSIS — R39.15 URGENCY OF URINATION: Primary | ICD-10-CM

## 2025-02-25 LAB
POC APPEARANCE, URINE: CLEAR
POC BILIRUBIN, URINE: NEGATIVE
POC BLOOD, URINE: NEGATIVE
POC COLOR, URINE: YELLOW
POC GLUCOSE, URINE: NEGATIVE MG/DL
POC KETONES, URINE: NEGATIVE MG/DL
POC LEUKOCYTES, URINE: NEGATIVE
POC NITRITE,URINE: NEGATIVE
POC PH, URINE: 7 PH
POC PROTEIN, URINE: NEGATIVE MG/DL
POC SPECIFIC GRAVITY, URINE: 1.01
POC UROBILINOGEN, URINE: 0.2 EU/DL

## 2025-02-25 PROCEDURE — 52000 CYSTOURETHROSCOPY: CPT | Performed by: UROLOGY

## 2025-02-25 PROCEDURE — 51798 US URINE CAPACITY MEASURE: CPT | Performed by: UROLOGY

## 2025-02-25 PROCEDURE — 99214 OFFICE O/P EST MOD 30 MIN: CPT | Performed by: UROLOGY

## 2025-02-25 PROCEDURE — 81003 URINALYSIS AUTO W/O SCOPE: CPT | Performed by: UROLOGY

## 2025-02-25 RX ORDER — MIRABEGRON 50 MG/1
50 TABLET, FILM COATED, EXTENDED RELEASE ORAL DAILY
Qty: 90 TABLET | Refills: 3 | Status: SHIPPED | OUTPATIENT
Start: 2025-02-25
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
## 2025-02-25 NOTE — PROGRESS NOTES
"Patient ID: Na Green \"Dunia\" is a 40 y.o. female.  Pt denies any pain today. States has not had any recent hospital admits or surgeries since their last visit. Denies any concerns about falling or safety. JML  She states she is not taking anything for her bladder presently- her insurance denied Myrbetriq and she did not start Gemtesa JML    Procedures  Pt denies any pain today. States has not had any recent hospital admits or surgeries since their last visit. Denies any concerns about falling or safety. VANDAL     Pt states she is not currently taking any urologic medications. She tried several that were prescribed by this office but only Myrbetriq worked. She would like for us to resend in hopes that with failed medications insurance will now cover.         Procedures  Pt took macrodantin 50mg po as prescribed  Anesthesia: Local 2% Lidocaine  Instruments: 6F flexible disposable cystoscope     Pt brought to procedure room and placed in dorsal lithotomy position. Pt draped and prepped in normal sterile fashion. 5ml lidocaine instilled into urethral meatus and 5ml instilled into vagina. Pt tolerated well.     I was present as chaperone for the entirety of the procedure Lauren Meléndez  Cystoscopy performed by Dr. Yasmany Segundo           Bedside \"Time Out\" Verification   Today's Date: 02/25/2025 I attest that this time out verification took place prior to the procedure.   Procedure: cysto   RN/LPN/MA:ARABELLA   Provider: [facility].   Verified By: RN/ARMANI/MA, NA GREEN and Provider.   Prior to the start of the procedure a time out was taken and the following were verified: the identity of the patient using two patient identifiers, the correct procedure, the correct site marked as indicated, the correct positioning for the patient and the correct equipment was obtained.   Cystoscopy - female NA GREEN identified using two (2) forms of identification.   Procedure: diagnostic cystourethroscopy.  Procedure Note: Time Started: " 2:00pm. Time Completed: 2:27 PM  Indications for procedure: irritable voiding symptoms. recurrent urinary tract infection (UTI).   Discussed with patient: Risks, benefits, and alternative were discussed in detail. Patient appears to understand and agrees to proceed. Patient has signed the procedure consent form.    CYSTOSCOPY:    Cystoscopy today reveals a normal urethra and bladder neck.  Once inside the bladder, both ureteral orifices were identified.  Full examination of the bladder once again reveals severe erythema in the bladder base and trigone.
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
## 2025-08-06 ENCOUNTER — TRANSCRIBE ORDERS (OUTPATIENT)
Dept: ADMINISTRATIVE | Age: 40
End: 2025-08-06

## 2025-08-06 DIAGNOSIS — Z12.31 ENCOUNTER FOR SCREENING MAMMOGRAM FOR MALIGNANT NEOPLASM OF BREAST: Primary | ICD-10-CM

## 2026-02-06 ENCOUNTER — APPOINTMENT (OUTPATIENT)
Dept: UROLOGY | Facility: CLINIC | Age: 41
End: 2026-02-06
Payer: COMMERCIAL